# Patient Record
Sex: MALE | Race: WHITE | NOT HISPANIC OR LATINO | Employment: FULL TIME | URBAN - METROPOLITAN AREA
[De-identification: names, ages, dates, MRNs, and addresses within clinical notes are randomized per-mention and may not be internally consistent; named-entity substitution may affect disease eponyms.]

---

## 2022-05-24 ENCOUNTER — TELEPHONE (OUTPATIENT)
Dept: SCHEDULING | Facility: IMAGING CENTER | Age: 27
End: 2022-05-24

## 2022-06-01 ENCOUNTER — OFFICE VISIT (OUTPATIENT)
Dept: MEDICAL GROUP | Facility: PHYSICIAN GROUP | Age: 27
End: 2022-06-01
Payer: COMMERCIAL

## 2022-06-01 VITALS
OXYGEN SATURATION: 96 % | DIASTOLIC BLOOD PRESSURE: 64 MMHG | WEIGHT: 312.9 LBS | RESPIRATION RATE: 70 BRPM | SYSTOLIC BLOOD PRESSURE: 106 MMHG | HEIGHT: 74 IN | TEMPERATURE: 98.6 F | HEART RATE: 70 BPM | BODY MASS INDEX: 40.16 KG/M2

## 2022-06-01 DIAGNOSIS — G47.33 OBSTRUCTIVE SLEEP APNEA: ICD-10-CM

## 2022-06-01 DIAGNOSIS — L73.9 FOLLICULITIS: ICD-10-CM

## 2022-06-01 DIAGNOSIS — R63.5 WEIGHT GAIN: ICD-10-CM

## 2022-06-01 DIAGNOSIS — Z00.00 WELLNESS EXAMINATION: ICD-10-CM

## 2022-06-01 DIAGNOSIS — J45.20 MILD INTERMITTENT REACTIVE AIRWAY DISEASE WITHOUT COMPLICATION: ICD-10-CM

## 2022-06-01 DIAGNOSIS — G43.909 MIGRAINE WITHOUT STATUS MIGRAINOSUS, NOT INTRACTABLE, UNSPECIFIED MIGRAINE TYPE: ICD-10-CM

## 2022-06-01 DIAGNOSIS — F31.9 BIPOLAR AFFECTIVE DISORDER, REMISSION STATUS UNSPECIFIED (HCC): ICD-10-CM

## 2022-06-01 PROBLEM — M47.816 ARTHROPATHY OF LUMBAR FACET JOINT: Status: ACTIVE | Noted: 2021-07-07

## 2022-06-01 PROBLEM — J45.909 REACTIVE AIRWAY DISEASE WITHOUT COMPLICATION: Status: ACTIVE | Noted: 2022-06-01

## 2022-06-01 PROCEDURE — 99204 OFFICE O/P NEW MOD 45 MIN: CPT | Performed by: FAMILY MEDICINE

## 2022-06-01 RX ORDER — CHLORHEXIDINE GLUCONATE ORAL RINSE 1.2 MG/ML
SOLUTION DENTAL
COMMUNITY
Start: 2022-04-02 | End: 2022-06-01

## 2022-06-01 RX ORDER — IBUPROFEN 800 MG/1
800 TABLET ORAL EVERY 6 HOURS PRN
COMMUNITY
Start: 2022-04-02

## 2022-06-01 RX ORDER — IBUPROFEN 600 MG/1
600 TABLET ORAL
COMMUNITY
Start: 2022-01-15 | End: 2022-06-01

## 2022-06-01 RX ORDER — OLOPATADINE HYDROCHLORIDE 1 MG/ML
1 SOLUTION/ DROPS OPHTHALMIC
COMMUNITY
Start: 2020-11-03 | End: 2022-06-01

## 2022-06-01 RX ORDER — DIVALPROEX SODIUM 500 MG/1
1 TABLET, DELAYED RELEASE ORAL 2 TIMES DAILY
COMMUNITY
Start: 2022-01-07 | End: 2022-06-01 | Stop reason: SDUPTHER

## 2022-06-01 RX ORDER — ONDANSETRON HYDROCHLORIDE 8 MG/1
TABLET, FILM COATED ORAL
COMMUNITY
Start: 2022-01-07 | End: 2024-01-07

## 2022-06-01 RX ORDER — DIVALPROEX SODIUM 500 MG/1
TABLET, DELAYED RELEASE ORAL
Qty: 90 TABLET | Refills: 1 | Status: SHIPPED | OUTPATIENT
Start: 2022-06-01 | End: 2022-11-30 | Stop reason: SDUPTHER

## 2022-06-01 RX ORDER — HYDROCODONE BITARTRATE AND ACETAMINOPHEN 5; 325 MG/1; MG/1
1 TABLET ORAL EVERY 6 HOURS PRN
COMMUNITY
Start: 2022-04-03 | End: 2022-06-01

## 2022-06-01 RX ORDER — ALBUTEROL SULFATE 90 UG/1
2 AEROSOL, METERED RESPIRATORY (INHALATION)
COMMUNITY
Start: 2022-01-07 | End: 2024-01-07

## 2022-06-01 RX ORDER — CETIRIZINE HYDROCHLORIDE 10 MG/1
10 TABLET ORAL DAILY
COMMUNITY
Start: 2022-01-20 | End: 2024-01-20

## 2022-06-01 RX ORDER — RIZATRIPTAN BENZOATE 10 MG/1
TABLET, ORALLY DISINTEGRATING ORAL
COMMUNITY
Start: 2021-09-27 | End: 2023-09-27

## 2022-06-01 RX ORDER — DIVALPROEX SODIUM 250 MG/1
1 TABLET, DELAYED RELEASE ORAL 2 TIMES DAILY
COMMUNITY
Start: 2022-01-07 | End: 2022-06-01 | Stop reason: SDUPTHER

## 2022-06-01 RX ORDER — CEPHALEXIN 500 MG/1
500 CAPSULE ORAL 3 TIMES DAILY
Qty: 21 CAPSULE | Refills: 0 | Status: SHIPPED | OUTPATIENT
Start: 2022-06-01 | End: 2022-06-08

## 2022-06-01 RX ORDER — FLUTICASONE PROPIONATE 50 MCG
1 SPRAY, SUSPENSION (ML) NASAL 2 TIMES DAILY
COMMUNITY
Start: 2021-07-06 | End: 2023-07-06

## 2022-06-01 RX ORDER — DIVALPROEX SODIUM 250 MG/1
TABLET, DELAYED RELEASE ORAL
Qty: 90 TABLET | Refills: 1 | Status: SHIPPED | OUTPATIENT
Start: 2022-06-01 | End: 2022-08-01

## 2022-06-01 RX ORDER — CLINDAMYCIN HYDROCHLORIDE 150 MG/1
150 CAPSULE ORAL 3 TIMES DAILY
COMMUNITY
Start: 2022-04-02 | End: 2022-06-01

## 2022-06-01 ASSESSMENT — PATIENT HEALTH QUESTIONNAIRE - PHQ9
SUM OF ALL RESPONSES TO PHQ QUESTIONS 1-9: 12
5. POOR APPETITE OR OVEREATING: 0 - NOT AT ALL
CLINICAL INTERPRETATION OF PHQ2 SCORE: 2

## 2022-06-01 NOTE — PROGRESS NOTES
Subjective:     CC:    Chief Complaint   Patient presents with   • Establish Care       HISTORY OF THE PRESENT ILLNESS: Patient is a 27 y.o. male. This pleasant patient is here today to establish care.  Patient has moved from [place] to here recently.  Patient is still taking his Depakote but he does not take it twice a day he just taking it once a day to 250 in the 500 mg.  Patient also has issues with migraine headaches he is on Maxalt he is getting 1-2 migraine headaches per week.  Patient does have a diagnosis of bipolar question if he has attention deficit.  Patient is agreeable to see psychiatry for this.  Patient did have a diagnosis of sleep apnea but patient was tested and he cannot tolerate the CPAP machine when he was in [place].  Patient admits to having weight gain.    No problem-specific Assessment & Plan notes found for this encounter.      Allergies: Penicillins    Current Outpatient Medications Ordered in Epic   Medication Sig Dispense Refill   • albuterol 108 (90 Base) MCG/ACT Aero Soln inhalation aerosol Inhale 2 Puffs.     • cetirizine (ZYRTEC) 10 MG Tab Take 10 mg by mouth every day.     • fluticasone (FLONASE) 50 MCG/ACT nasal spray Administer 1 Spray into affected nostril(S) 2 times a day.     • ibuprofen (MOTRIN) 800 MG Tab Take 800 mg by mouth every 6 hours as needed.  FOR PAIN     • ondansetron (ZOFRAN) 8 MG Tab Take 1 tablet by mouth every 8 to 12 hours as needed for nausea or vomiting     • rizatriptan (MAXALT-MLT) 10 MG disintegrating tablet Dissolve 1 tablet in mouth at onset of migraine headache. May repeat every 2 hours if migraine is not relieved. Do not exceed 3 tablets in 24 hours     • divalproex (DEPAKOTE) 250 MG Tablet Delayed Response 1 p.o. in the a.m. 90 Tablet 1   • divalproex (DEPAKOTE) 500 MG Tablet Delayed Response To take 1 along with the 250 mg Depakote in the a.m. 90 Tablet 1   • cephALEXin (KEFLEX) 500 MG Cap Take 1 Capsule by mouth 3 times a day for 7 days. 21  "Capsule 0     No current [facility]-ordered facility-administered medications on file.       No past medical history on file.    Past Surgical History:   Procedure Laterality Date   • APPENDECTOMY     • TONSILLECTOMY         Social History     Tobacco Use   • Smoking status: Never Smoker   • Smokeless tobacco: Never Used   Vaping Use   • Vaping Use: Never used   Substance Use Topics   • Alcohol use: Not Currently   • Drug use: Never       Social History     Social History Narrative   • Not on file       Family History   Problem Relation Age of Onset   • GI Disease Mother         Crohn's   • Bipolar disorder Father    • Bipolar disorder Sister    • Migraines Sister    • Autism Brother    • Bipolar disorder Brother    • Cancer Maternal Grandfather    • Prostate cancer Maternal Grandfather        Health Maintenance: Completed    ROS:   Gen: no fevers/chills  Eyes: no changes in vision  ENT: no sore throat, no hearing loss, no bloody nose  Pulm: no sob, no cough  CV: no chest pain, no palpitations  GI: no nausea/vomiting, no diarrhea  Neuro: no headaches, no numbness/tingling  Heme/Lymph: no easy bruising      Objective:     Exam: /64 (BP Location: Left arm, Patient Position: Sitting, BP Cuff Size: Large adult)   Pulse 70   Temp 37 °C (98.6 °F) (Temporal)   Resp (!) 70   Ht 1.88 m (6' 2\")   Wt (!) 142 kg (312 lb 14.4 oz)   SpO2 96%  Body mass index is 40.17 kg/m².    Gen: Alert and oriented, No apparent distress.  Skin: Warm and dry.  Patient has on his inner thigh on the left some follicular areas not severely infected but present  Eyes: Sclera wnl Pupils normal in size  ENT: Canals wnl and TM are not red, mouth negative redness or exudates Mallampati score of class II  Lungs: Normal effort, CTA bilaterally, no wheezes, rhonchi, or rales  CV: Regular rate and rhythm. No murmurs, rubs, or gallops.  ABD: Soft non-tender no organomegaly  Musculoskeletal: Normal gait. No extremity cyanosis, clubbing, or " edema.  Neuro: Oriented to person, place and time  Psych: Mood is wnl       Labs: Fasting labs were ordered patient given a listing of all the [facility]    Assessment & Plan:   27 y.o. male with the following -    1. Mild intermittent reactive airway disease without complication  Patient does not have to use his inhaler very often this is a chronic problem  - CBC WITH DIFFERENTIAL; Future    2. Bipolar affective disorder, remission status unspecified (HCC)  Patient only using the Depakote once a day but would like to see psychiatry this is a chronic problem.  Patient denies any suicidal thoughts.    3. Migraine without status migrainosus, not intractable, unspecified migraine type    4. Obstructive sleep apnea    5. Weight gain  - TSH; Future  - TRIIDOTHYRONINE; Future    6. Wellness examination  - Comp Metabolic Panel; Future  - Lipid Profile; Future    7. Folliculitis    - divalproex (DEPAKOTE) 250 MG Tablet Delayed Response; 1 p.o. in the a.m.  Dispense: 90 Tablet; Refill: 1  - divalproex (DEPAKOTE) 500 MG Tablet Delayed Response; To take 1 along with the 250 mg Depakote in the a.m.  Dispense: 90 Tablet; Refill: 1  - cephALEXin (KEFLEX) 500 MG Cap; Take 1 Capsule by mouth 3 times a day for 7 days.  Dispense: 21 Capsule; Refill: 0       Return in about 3 weeks (around 6/22/2022).    Please note that this dictation was created using voice recognition software. I have made every reasonable attempt to correct obvious errors, but I expect that there are errors of grammar and possibly content that I did not discover before finalizing the note.

## 2022-06-20 ENCOUNTER — HOSPITAL ENCOUNTER (OUTPATIENT)
Dept: LAB | Facility: MEDICAL CENTER | Age: 27
End: 2022-06-20
Attending: FAMILY MEDICINE
Payer: COMMERCIAL

## 2022-06-20 DIAGNOSIS — R63.5 WEIGHT GAIN: ICD-10-CM

## 2022-06-20 DIAGNOSIS — Z00.00 WELLNESS EXAMINATION: ICD-10-CM

## 2022-06-20 DIAGNOSIS — J45.20 MILD INTERMITTENT REACTIVE AIRWAY DISEASE WITHOUT COMPLICATION: ICD-10-CM

## 2022-06-20 LAB
ALBUMIN SERPL BCP-MCNC: 4.4 G/DL (ref 3.2–4.9)
ALBUMIN/GLOB SERPL: 1.6 G/DL
ALP SERPL-CCNC: 90 U/L (ref 30–99)
ALT SERPL-CCNC: 14 U/L (ref 2–50)
ANION GAP SERPL CALC-SCNC: 11 MMOL/L (ref 7–16)
AST SERPL-CCNC: 16 U/L (ref 12–45)
BASOPHILS # BLD AUTO: 0.9 % (ref 0–1.8)
BASOPHILS # BLD: 0.06 K/UL (ref 0–0.12)
BILIRUB SERPL-MCNC: 0.3 MG/DL (ref 0.1–1.5)
BUN SERPL-MCNC: 15 MG/DL (ref 8–22)
CALCIUM SERPL-MCNC: 9.3 MG/DL (ref 8.5–10.5)
CHLORIDE SERPL-SCNC: 102 MMOL/L (ref 96–112)
CHOLEST SERPL-MCNC: 166 MG/DL (ref 100–199)
CO2 SERPL-SCNC: 25 MMOL/L (ref 20–33)
CREAT SERPL-MCNC: 0.94 MG/DL (ref 0.5–1.4)
EOSINOPHIL # BLD AUTO: 0.23 K/UL (ref 0–0.51)
EOSINOPHIL NFR BLD: 3.4 % (ref 0–6.9)
ERYTHROCYTE [DISTWIDTH] IN BLOOD BY AUTOMATED COUNT: 42.1 FL (ref 35.9–50)
FASTING STATUS PATIENT QL REPORTED: NORMAL
GFR SERPLBLD CREATININE-BSD FMLA CKD-EPI: 115 ML/MIN/1.73 M 2
GLOBULIN SER CALC-MCNC: 2.7 G/DL (ref 1.9–3.5)
GLUCOSE SERPL-MCNC: 94 MG/DL (ref 65–99)
HCT VFR BLD AUTO: 52.2 % (ref 42–52)
HDLC SERPL-MCNC: 29 MG/DL
HGB BLD-MCNC: 16.9 G/DL (ref 14–18)
IMM GRANULOCYTES # BLD AUTO: 0.03 K/UL (ref 0–0.11)
IMM GRANULOCYTES NFR BLD AUTO: 0.4 % (ref 0–0.9)
LDLC SERPL CALC-MCNC: 99 MG/DL
LYMPHOCYTES # BLD AUTO: 2.69 K/UL (ref 1–4.8)
LYMPHOCYTES NFR BLD: 39.2 % (ref 22–41)
MCH RBC QN AUTO: 27.4 PG (ref 27–33)
MCHC RBC AUTO-ENTMCNC: 32.4 G/DL (ref 33.7–35.3)
MCV RBC AUTO: 84.7 FL (ref 81.4–97.8)
MONOCYTES # BLD AUTO: 0.57 K/UL (ref 0–0.85)
MONOCYTES NFR BLD AUTO: 8.3 % (ref 0–13.4)
NEUTROPHILS # BLD AUTO: 3.28 K/UL (ref 1.82–7.42)
NEUTROPHILS NFR BLD: 47.8 % (ref 44–72)
NRBC # BLD AUTO: 0 K/UL
NRBC BLD-RTO: 0 /100 WBC
PLATELET # BLD AUTO: 319 K/UL (ref 164–446)
PMV BLD AUTO: 9.7 FL (ref 9–12.9)
POTASSIUM SERPL-SCNC: 4.5 MMOL/L (ref 3.6–5.5)
PROT SERPL-MCNC: 7.1 G/DL (ref 6–8.2)
RBC # BLD AUTO: 6.16 M/UL (ref 4.7–6.1)
SODIUM SERPL-SCNC: 138 MMOL/L (ref 135–145)
T3 SERPL-MCNC: 127 NG/DL (ref 60–181)
TRIGL SERPL-MCNC: 189 MG/DL (ref 0–149)
TSH SERPL DL<=0.005 MIU/L-ACNC: 2.02 UIU/ML (ref 0.38–5.33)
WBC # BLD AUTO: 6.9 K/UL (ref 4.8–10.8)

## 2022-06-20 PROCEDURE — 84443 ASSAY THYROID STIM HORMONE: CPT

## 2022-06-20 PROCEDURE — 84480 ASSAY TRIIODOTHYRONINE (T3): CPT

## 2022-06-20 PROCEDURE — 85025 COMPLETE CBC W/AUTO DIFF WBC: CPT

## 2022-06-20 PROCEDURE — 80061 LIPID PANEL: CPT

## 2022-06-20 PROCEDURE — 36415 COLL VENOUS BLD VENIPUNCTURE: CPT

## 2022-06-20 PROCEDURE — 80053 COMPREHEN METABOLIC PANEL: CPT

## 2022-11-30 ENCOUNTER — PATIENT MESSAGE (OUTPATIENT)
Dept: MEDICAL GROUP | Facility: PHYSICIAN GROUP | Age: 27
End: 2022-11-30
Payer: COMMERCIAL

## 2022-11-30 RX ORDER — DIVALPROEX SODIUM 500 MG/1
TABLET, DELAYED RELEASE ORAL
Qty: 90 TABLET | Refills: 0 | Status: SHIPPED | OUTPATIENT
Start: 2022-11-30 | End: 2024-02-22

## 2022-11-30 RX ORDER — DIVALPROEX SODIUM 250 MG/1
TABLET, DELAYED RELEASE ORAL
Qty: 90 TABLET | Refills: 0 | Status: SHIPPED | OUTPATIENT
Start: 2022-11-30 | End: 2024-02-22

## 2023-02-28 ENCOUNTER — OFFICE VISIT (OUTPATIENT)
Dept: MEDICAL GROUP | Facility: PHYSICIAN GROUP | Age: 28
End: 2023-02-28
Payer: COMMERCIAL

## 2023-02-28 VITALS
DIASTOLIC BLOOD PRESSURE: 70 MMHG | BODY MASS INDEX: 37.92 KG/M2 | HEIGHT: 75 IN | HEART RATE: 68 BPM | OXYGEN SATURATION: 97 % | RESPIRATION RATE: 20 BRPM | WEIGHT: 305 LBS | TEMPERATURE: 97 F | SYSTOLIC BLOOD PRESSURE: 120 MMHG

## 2023-02-28 DIAGNOSIS — H92.03 OTALGIA OF BOTH EARS: ICD-10-CM

## 2023-02-28 DIAGNOSIS — H65.03 BILATERAL ACUTE SEROUS OTITIS MEDIA, RECURRENCE NOT SPECIFIED: ICD-10-CM

## 2023-02-28 PROCEDURE — 99213 OFFICE O/P EST LOW 20 MIN: CPT | Performed by: NURSE PRACTITIONER

## 2023-02-28 RX ORDER — DOXYCYCLINE HYCLATE 100 MG
100 TABLET ORAL 2 TIMES DAILY
Qty: 20 TABLET | Refills: 0 | Status: SHIPPED | OUTPATIENT
Start: 2023-02-28 | End: 2023-03-10

## 2023-02-28 ASSESSMENT — PATIENT HEALTH QUESTIONNAIRE - PHQ9
5. POOR APPETITE OR OVEREATING: 2 - MORE THAN HALF THE DAYS
CLINICAL INTERPRETATION OF PHQ2 SCORE: 4
SUM OF ALL RESPONSES TO PHQ QUESTIONS 1-9: 18

## 2023-02-28 ASSESSMENT — FIBROSIS 4 INDEX: FIB4 SCORE: 0.34

## 2023-02-28 NOTE — ASSESSMENT & PLAN NOTE
Acute and ongoing. He states that for the past 3-5 weeks he has been dealing with some ear pain. Denies any fever, chills, body aches, or sinus congestion. Has not taken anything for the ear pain.

## 2023-02-28 NOTE — PROGRESS NOTES
Subjective  Chief Complaint  Ear Pain    History of Present Illness  Lex Foster is a 27 y.o. male. This established patient is here today to discuss his ear pain.    His PCP is Dr. Mary Anne Alvarez.    Otalgia of both ears  Acute and ongoing. He states that for the past 3-5 weeks he has been dealing with some ear pain. Denies any fever, chills, body aches, or sinus congestion. Has not taken anything for the ear pain.    Past Medical History    Allergies: Penicillins  History reviewed. No pertinent past medical history.  Past Surgical History:   Procedure Laterality Date    APPENDECTOMY      TONSILLECTOMY       Current Outpatient Medications Ordered in Epic   Medication Sig Dispense Refill    doxycycline (VIBRAMYCIN) 100 MG Tab Take 1 Tablet by mouth 2 times a day for 10 days. 20 Tablet 0    divalproex (DEPAKOTE) 500 MG Tablet Delayed Response To take 1 along with the 250 mg Depakote in the a.m. 90 Tablet 0    divalproex (DEPAKOTE) 250 MG Tablet Delayed Response Take 1 tablet along with the 500 mg Depakote nightly a.m. 90 Tablet 0    albuterol 108 (90 Base) MCG/ACT Aero Soln inhalation aerosol Inhale 2 Puffs.      cetirizine (ZYRTEC) 10 MG Tab Take 10 mg by mouth every day.      fluticasone (FLONASE) 50 MCG/ACT nasal spray Administer 1 Spray into affected nostril(S) 2 times a day.      ibuprofen (MOTRIN) 800 MG Tab Take 800 mg by mouth every 6 hours as needed.  FOR PAIN      ondansetron (ZOFRAN) 8 MG Tab Take 1 tablet by mouth every 8 to 12 hours as needed for nausea or vomiting      rizatriptan (MAXALT-MLT) 10 MG disintegrating tablet Dissolve 1 tablet in mouth at onset of migraine headache. May repeat every 2 hours if migraine is not relieved. Do not exceed 3 tablets in 24 hours       No current [facility]-ordered facility-administered medications on file.     Family History:    Family History   Problem Relation Age of Onset    GI Disease Mother         Crohn's    Bipolar disorder Father     Bipolar disorder Sister  "    Migraines Sister     Autism Brother     Bipolar disorder Brother     Cancer Maternal Grandfather     Prostate cancer Maternal Grandfather       Personal/Social History:    Social History     Tobacco Use    Smoking status: Never    Smokeless tobacco: Never   Vaping Use    Vaping Use: Never used   Substance Use Topics    Alcohol use: Not Currently    Drug use: Never     Social History     Social History Narrative    Not on file      Review of Systems:   General: Negative for fever/chills and unexpected weight change.   ENT:  Negative for hearing changes, ear pain, congestion, sore throat, and neck pain. Positive for ear pain.  Respiratory:  Negative for cough and dyspnea.    Cardiovascular:  Negative for chest pain and palpitations.  Musculoskeletal:  Negative for myalgias.   Skin:  Negative for rash.     Objective  Physical Exam:   /70 (BP Location: Left arm, Patient Position: Sitting, BP Cuff Size: Large adult)   Pulse 68   Temp 36.1 °C (97 °F) (Temporal)   Resp 20   Ht 1.905 m (6' 3\")   Wt (!) 138 kg (305 lb)   SpO2 97%  Body mass index is 38.12 kg/m².  General:  Alert and oriented.  Well appearing.  NAD  Neck: Supple without JVD. No lymphadenopathy.  ENT: Bilateral ear canals clear with erythema, tympanic membranes with bulging effusion.  Pulmonary:  Normal effort.  Clear to ausculation without rales, ronchi, or wheezing.  Cardiovascular:  Regular rate and rhythm without murmur, rubs or gallop.   Skin:  Warm and dry.  No obvious lesions.  Musculoskeletal:  No extremity cyanosis, clubbing, or edema.      Assessment/Plan  1. Otalgia of both ears  2. Bilateral acute serous otitis media, recurrence not specified  New diagnosis.  Acute and ongoing.  Discussed that he does have a bilateral ear infection and needs to be treated with antibiotics, he verbalized understanding.  Discussed Doxycycline risks, benefits and side effects, he verbalized understanding.  Educated on taking the entire course of " antibiotics even if he starts to feel better.  - doxycycline (VIBRAMYCIN) 100 MG Tab; Take 1 Tablet by mouth 2 times a day for 10 days.  Dispense: 20 Tablet; Refill: 0      Health Maintenance: Deferred to PCP.    Return if symptoms worsen or fail to improve.    Discussed that the patient carries some responsibility in management of their health care.    Please note that this dictation was created using voice recognition software. I have made every reasonable attempt to correct obvious errors, but I expect that there are errors of grammar and possibly content that I did not discover before finalizing the note.    DUKE Finn  [facility]

## 2023-03-21 ENCOUNTER — OFFICE VISIT (OUTPATIENT)
Dept: MEDICAL GROUP | Facility: PHYSICIAN GROUP | Age: 28
End: 2023-03-21
Payer: COMMERCIAL

## 2023-03-21 VITALS
HEIGHT: 75 IN | WEIGHT: 310.5 LBS | DIASTOLIC BLOOD PRESSURE: 78 MMHG | BODY MASS INDEX: 38.61 KG/M2 | SYSTOLIC BLOOD PRESSURE: 122 MMHG | OXYGEN SATURATION: 96 % | RESPIRATION RATE: 20 BRPM | HEART RATE: 64 BPM | TEMPERATURE: 97 F

## 2023-03-21 DIAGNOSIS — H92.03 OTALGIA OF BOTH EARS: ICD-10-CM

## 2023-03-21 DIAGNOSIS — R68.84 JAW PAIN: ICD-10-CM

## 2023-03-21 PROCEDURE — 99213 OFFICE O/P EST LOW 20 MIN: CPT | Performed by: NURSE PRACTITIONER

## 2023-03-21 ASSESSMENT — FIBROSIS 4 INDEX: FIB4 SCORE: 0.34

## 2023-03-21 NOTE — ASSESSMENT & PLAN NOTE
Acute and ongoing. He was seen about 3 weeks ago and was prescribed medication for his ear pain. He states that it got better a bit but he is now noticing that when he is eating and biting down he will get ear pain.

## 2023-03-21 NOTE — PROGRESS NOTES
Subjective  Chief Complaint  Ear Pain    History of Present Illness  Lex Foster is a 27 y.o. male. This established patient is here today to follow up on his ear pain.    Otalgia of both ears  Acute and ongoing. He was seen about 3 weeks ago and was prescribed medication for his ear pain. He states that it got better a bit but he is now noticing that when he is eating and biting down he will get ear pain.    Past Medical History    Allergies: Penicillins  History reviewed. No pertinent past medical history.  Past Surgical History:   Procedure Laterality Date    APPENDECTOMY      TONSILLECTOMY       Current Outpatient Medications Ordered in Epic   Medication Sig Dispense Refill    divalproex (DEPAKOTE) 500 MG Tablet Delayed Response To take 1 along with the 250 mg Depakote in the a.m. 90 Tablet 0    divalproex (DEPAKOTE) 250 MG Tablet Delayed Response Take 1 tablet along with the 500 mg Depakote nightly a.m. 90 Tablet 0    albuterol 108 (90 Base) MCG/ACT Aero Soln inhalation aerosol Inhale 2 Puffs.      cetirizine (ZYRTEC) 10 MG Tab Take 10 mg by mouth every day.      fluticasone (FLONASE) 50 MCG/ACT nasal spray Administer 1 Spray into affected nostril(S) 2 times a day.      ibuprofen (MOTRIN) 800 MG Tab Take 800 mg by mouth every 6 hours as needed.  FOR PAIN      ondansetron (ZOFRAN) 8 MG Tab Take 1 tablet by mouth every 8 to 12 hours as needed for nausea or vomiting      rizatriptan (MAXALT-MLT) 10 MG disintegrating tablet Dissolve 1 tablet in mouth at onset of migraine headache. May repeat every 2 hours if migraine is not relieved. Do not exceed 3 tablets in 24 hours       No current [facility]-ordered facility-administered medications on file.     Family History:    Family History   Problem Relation Age of Onset    GI Disease Mother         Crohn's    Bipolar disorder Father     Bipolar disorder Sister     Migraines Sister     Autism Brother     Bipolar disorder Brother     Cancer Maternal Grandfather      "Prostate cancer Maternal Grandfather       Personal/Social History:    Social History     Tobacco Use    Smoking status: Never    Smokeless tobacco: Never   Vaping Use    Vaping Use: Never used   Substance Use Topics    Alcohol use: Not Currently    Drug use: Never     Social History     Social History Narrative    Not on file      Review of Systems:   General: Negative for fever/chills and unexpected weight change.   ENT:  Negative for hearing changes, congestion, sore throat, and neck pain. Positive for ear pain and jaw pain.  Respiratory:  Negative for cough and dyspnea.    Cardiovascular:  Negative for chest pain and palpitations.  Musculoskeletal:  Negative for myalgias.   Skin:  Negative for rash.     Objective  Physical Exam:   /78 (BP Location: Left arm, Patient Position: Sitting, BP Cuff Size: Large adult)   Pulse 64   Temp 36.1 °C (97 °F) (Temporal)   Resp 20   Ht 1.905 m (6' 3\")   Wt (!) 141 kg (310 lb 8 oz)   SpO2 96%  Body mass index is 38.81 kg/m².  General:  Alert and oriented.  Well appearing.  NAD  Neck: Supple without JVD. No lymphadenopathy.  ENT: Bilateral ear canals clear with mild erythema, tympanic membranes non bulging with no effusion. Bilateral upper jaw tender to palpation.  Pulmonary:  Normal effort.  Clear to ausculation without rales, ronchi, or wheezing.  Cardiovascular:  Regular rate and rhythm without murmur, rubs or gallop.   Skin:  Warm and dry.  No obvious lesions.  Musculoskeletal:  No extremity cyanosis, clubbing, or edema.      Assessment/Plan  1. Otalgia of both ears  2. Jaw pain  Acute and ongoing.  Discussed that his previous ear infection has resolved with the treatment of antibiotics. Discussed that he may be experiencing TMJ symptoms and that he should be seen by an ENT, he is agreeable.  He did state that he does grind his teeth a lot at night.  - Referral to ENT      Health Maintenance: Discussed with patient.    Return if symptoms worsen or fail to " improve.    Discussed that the patient carries some responsibility in management of their health care.    Please note that this dictation was created using voice recognition software. I have made every reasonable attempt to correct obvious errors, but I expect that there are errors of grammar and possibly content that I did not discover before finalizing the note.    DUKE Finn  [facility]

## 2023-05-30 ENCOUNTER — OFFICE VISIT (OUTPATIENT)
Dept: BEHAVIORAL HEALTH | Facility: CLINIC | Age: 28
End: 2023-05-30
Payer: COMMERCIAL

## 2023-05-30 DIAGNOSIS — F31.9 BIPOLAR 1 DISORDER, DEPRESSED (HCC): ICD-10-CM

## 2023-05-30 DIAGNOSIS — F90.2 ATTENTION DEFICIT HYPERACTIVITY DISORDER, COMBINED TYPE: ICD-10-CM

## 2023-05-30 PROCEDURE — 90792 PSYCH DIAG EVAL W/MED SRVCS: CPT | Performed by: PSYCHIATRY & NEUROLOGY

## 2023-05-30 RX ORDER — OXCARBAZEPINE 150 MG/1
150 TABLET, FILM COATED ORAL 2 TIMES DAILY
Qty: 60 TABLET | Refills: 0 | Status: SHIPPED | OUTPATIENT
Start: 2023-05-30 | End: 2023-06-29

## 2023-05-30 NOTE — PROGRESS NOTES
[facility]   Therapy Progress Note      This provider informed the patient their medical records are totally confidential except for the use by other providers involved in their care, or if the patient signs a release, or to report instances of child or elder abuse, or if it is determined they are an immediate risk to harm themselves or others.    Name: Lex Foster  MRN: [id number]  : 1995  Age: 28 y.o.  Date of assessment: 2023  PCP: JOSE DE JESUS Velasco      Present Illness:   Chart reviewed prior to seeing him in my office.  He is 28 years old,  approximately 9 months and has a 6-month-old son.  He works as a  for a solar [employer] company.  He is referred for evaluation of bipolar disorder and ADHD.  He does experience abrupt mood changes for no particular reason and at times is very irritable.  He has also overspent previously he does have episodes of racing thoughts which can last 10 or 15 minutes on a daily basis this has been going on for years.  He usually feels depressed afterwards.  Self-esteem is down.  He does experience insomnia with frequent awakening.  Appetite is okay.  Energy and short-term memory are affected.  He denies being suicidal.  When questioned about ADHD he indicates that he is restless, distractible, and impulsive at times.  He denies any difficulty in completing projects.  He struggled academically but did get a GED.  He has difficulty focusing on movies and books.    Past Psych History:   No previous psychiatric hospitalization.  He did see a psychiatrist once years ago.    Substance Abuse History:  No alcohol or substance abuse problems.    Family History:   His father is bipolar.  His 21-year-old sister has recently been diagnosed as having bipolar disorder.    Medical History:  Asthma, migraines    Psych Medications:  He is currently on Depakote 750 mg total a.m. but is not sure it has any significant benefit for his bipolar  symptoms.    Allergies:   Penicillin    Mental Status:   He is alert, oriented, and cooperative.  Relatedness is good.  Grooming is good.  Speech is normal rate.  Anxious.  Memory is fairly good.  Insight and judgment are fairly good.  No indication of psychotic thinking.    Current Risk:       Suicidal: Not suicidal       Homicidal: Not homicidal       Self-Harm: No plan to harm self       Relapse (Low/Moderate/High): Moderate       Crisis Safety Plan Reviewed: Discussed with patient    Diagnosis:  Bipolar 1 disorder  ADHD    Treatment Plan:  The current treatment plan consists of a follow-up visit in 2 weeks and then monthly psychiatric sessions designed to evaluate his bipolar disorder and ADHD.    Duration will be for a minimum of 12 months and will be reviewed at each visit.    Goals: Stabilization of moods with improvement in ADHD symptoms in order to prevent relapse due to the chronic nature of his behavioral health problems and mental illness.  Start Trileptal 150 mg twice daily.  Possible side effects discussed.  If he responds well to Trileptal Depakote will be tapered off.  When his moods are stable his ADHD symptoms will be addressed.      Marcio Powers M.D.     This note was created using voice recognition software (Dragon). The accuracy of the dictation is limited by the abilities of the software. I have reviewed the note prior to signing, however some errors in grammar and context are still possible. If you have any questions related to this note please do not hesitate to contact our office.

## 2023-09-22 ENCOUNTER — OFFICE VISIT (OUTPATIENT)
Dept: MEDICAL GROUP | Facility: PHYSICIAN GROUP | Age: 28
End: 2023-09-22
Payer: COMMERCIAL

## 2023-09-22 VITALS
DIASTOLIC BLOOD PRESSURE: 76 MMHG | RESPIRATION RATE: 18 BRPM | HEIGHT: 75 IN | OXYGEN SATURATION: 94 % | BODY MASS INDEX: 37.7 KG/M2 | HEART RATE: 86 BPM | TEMPERATURE: 97.5 F | SYSTOLIC BLOOD PRESSURE: 118 MMHG | WEIGHT: 303.2 LBS

## 2023-09-22 DIAGNOSIS — E55.9 VITAMIN D DEFICIENCY: ICD-10-CM

## 2023-09-22 DIAGNOSIS — G43.909 MIGRAINE WITHOUT STATUS MIGRAINOSUS, NOT INTRACTABLE, UNSPECIFIED MIGRAINE TYPE: ICD-10-CM

## 2023-09-22 DIAGNOSIS — R63.5 WEIGHT GAIN: ICD-10-CM

## 2023-09-22 DIAGNOSIS — E78.6 LOW HDL (UNDER 40): ICD-10-CM

## 2023-09-22 PROCEDURE — 3078F DIAST BP <80 MM HG: CPT | Performed by: FAMILY MEDICINE

## 2023-09-22 PROCEDURE — 3074F SYST BP LT 130 MM HG: CPT | Performed by: FAMILY MEDICINE

## 2023-09-22 PROCEDURE — 99214 OFFICE O/P EST MOD 30 MIN: CPT | Performed by: FAMILY MEDICINE

## 2023-09-22 ASSESSMENT — FIBROSIS 4 INDEX: FIB4 SCORE: 0.35

## 2023-09-22 NOTE — PROGRESS NOTES
Subjective:     CC:   Chief Complaint   Patient presents with    Follow-Up       HPI:   Lex presents today requesting a work note to the fact that he suffers with migraines and the he may have to leave work if he has a severe headache.  Patient's been diagnosed as having migraines as a child.  Patient has not seen neurologist in the last couple years.  Patient states that he had a migraine maybe a day ago and then 3 weeks prior.  Patient also did see psychiatry but at this time patient does not want to follow-up he was seen recently in May.  Patient is not wanting to be on medication for his psychological issues and discussing his migraines he is only taking over-the-counter medication when he gets his migraine.    History reviewed. No pertinent past medical history.    Social History     Tobacco Use    Smoking status: Never    Smokeless tobacco: Never   Vaping Use    Vaping Use: Never used   Substance Use Topics    Alcohol use: Not Currently    Drug use: Never       Current Outpatient Medications Ordered in Epic   Medication Sig Dispense Refill    divalproex (DEPAKOTE) 500 MG Tablet Delayed Response To take 1 along with the 250 mg Depakote in the a.m. 90 Tablet 0    divalproex (DEPAKOTE) 250 MG Tablet Delayed Response Take 1 tablet along with the 500 mg Depakote nightly a.m. 90 Tablet 0    albuterol 108 (90 Base) MCG/ACT Aero Soln inhalation aerosol Inhale 2 Puffs.      cetirizine (ZYRTEC) 10 MG Tab Take 10 mg by mouth every day.      ibuprofen (MOTRIN) 800 MG Tab Take 800 mg by mouth every 6 hours as needed.  FOR PAIN      ondansetron (ZOFRAN) 8 MG Tab Take 1 tablet by mouth every 8 to 12 hours as needed for nausea or vomiting      rizatriptan (MAXALT-MLT) 10 MG disintegrating tablet Dissolve 1 tablet in mouth at onset of migraine headache. May repeat every 2 hours if migraine is not relieved. Do not exceed 3 tablets in 24 hours       No current [facility]-ordered facility-administered medications on file.  "      Allergies:  Penicillins    Health Maintenance: Completed    ROS:  Gen: no fevers/chills, no changes in weight  Eyes: no changes in vision  ENT: no sore throat, no hearing loss, no bloody nose  Pulm: no sob, no cough  CV: no chest pain, no palpitations  GI: no nausea/vomiting, no diarrhea  : no dysuria  Neuro: no headaches, no numbness/tingling  Heme/Lymph: no easy bruising    Objective:     Exam:  /76 (BP Location: Left arm, Patient Position: Sitting, BP Cuff Size: Large adult)   Pulse 86   Temp 36.4 °C (97.5 °F) (Temporal)   Resp 18   Ht 1.905 m (6' 3\")   Wt (!) 138 kg (303 lb 3.2 oz)   SpO2 94%   BMI 37.90 kg/m²  Body mass index is 37.9 kg/m².    Gen: Alert and oriented, No apparent distress.  Skin: Warm and dry.  No obvious lesions.  Eyes: Sclera wnl Pupils normal in size  Lungs: Normal effort, CTA bilaterally, no wheezes, rhonchi, or rales  CV: Regular rate and rhythm. No murmurs, rubs, or gallops.  Musculoskeletal: Normal gait. No extremity cyanosis, clubbing, or edema.  Neuro: Oriented to person, place and time  Psych: Mood is wnl       Labs: Fasting labs were ordered    Assessment & Plan:     28 y.o. male with the following -     1. Migraine without status migrainosus, not intractable, unspecified migraine type  I recommend ordering lab work I also wrote a referral to neurology.  Also wrote him a work note stating that he has migraines I did get permission from patient to put that in the note.  And I am write a referral to neurology it may take up to 2 months to get appointment.  - CBC WITH DIFFERENTIAL; Future  - Comp Metabolic Panel; Future    2. Low HDL (under 40)  Recommend him getting his lab work done  - Lipid Profile; Future    3. Weight gain  - TSH; Future    4. Vitamin D deficiency  Last vitamin D was decreased we will recheck this again  - VITAMIN D,25 HYDROXY (DEFICIENCY); Future       Return in about 2 months (around 11/22/2023).    Please note that this dictation was created " using voice recognition software. I have made every reasonable attempt to correct obvious errors, but I expect that there are errors of grammar and possibly content that I did not discover before finalizing the note.

## 2023-09-22 NOTE — LETTER
September 22, 2023         Patient: Lex Galvin   YOB: 1995   Date of Visit: 9/22/2023           To Whom it May Concern:    Lex Galvin was seen in my clinic on 9/22/2023.  Patient does have migraines and he may not be able to work on days when his headaches get severe.  I will be referring him to neurology for evaluation it may take up to 2 months to be seen by her neurologist.    If you have any questions or concerns, please don't hesitate to call.        Sincerely,           Mary Anne Alvarez M.D.  Electronically Signed

## 2023-09-26 ENCOUNTER — OFFICE VISIT (OUTPATIENT)
Dept: URGENT CARE | Facility: CLINIC | Age: 28
End: 2023-09-26
Payer: COMMERCIAL

## 2023-09-26 VITALS
TEMPERATURE: 97.6 F | BODY MASS INDEX: 36.53 KG/M2 | SYSTOLIC BLOOD PRESSURE: 130 MMHG | RESPIRATION RATE: 20 BRPM | WEIGHT: 300 LBS | HEART RATE: 83 BPM | DIASTOLIC BLOOD PRESSURE: 70 MMHG | OXYGEN SATURATION: 98 % | HEIGHT: 76 IN

## 2023-09-26 DIAGNOSIS — J06.9 VIRAL URI: Primary | ICD-10-CM

## 2023-09-26 DIAGNOSIS — J02.9 SORE THROAT: ICD-10-CM

## 2023-09-26 LAB
FLUAV RNA SPEC QL NAA+PROBE: NEGATIVE
FLUBV RNA SPEC QL NAA+PROBE: NEGATIVE
RSV RNA SPEC QL NAA+PROBE: NEGATIVE
S PYO DNA SPEC NAA+PROBE: NOT DETECTED
SARS-COV-2 RNA RESP QL NAA+PROBE: NEGATIVE

## 2023-09-26 PROCEDURE — 3078F DIAST BP <80 MM HG: CPT | Performed by: PHYSICIAN ASSISTANT

## 2023-09-26 PROCEDURE — 87651 STREP A DNA AMP PROBE: CPT | Performed by: PHYSICIAN ASSISTANT

## 2023-09-26 PROCEDURE — 3075F SYST BP GE 130 - 139MM HG: CPT | Performed by: PHYSICIAN ASSISTANT

## 2023-09-26 PROCEDURE — 0241U POCT CEPHEID COV-2, FLU A/B, RSV - PCR: CPT | Performed by: PHYSICIAN ASSISTANT

## 2023-09-26 PROCEDURE — 99213 OFFICE O/P EST LOW 20 MIN: CPT | Performed by: PHYSICIAN ASSISTANT

## 2023-09-26 ASSESSMENT — ENCOUNTER SYMPTOMS
HEADACHES: 1
VOMITING: 0
FEVER: 0
SHORTNESS OF BREATH: 0
COUGH: 1
RHINORRHEA: 1
DIZZINESS: 0
SORE THROAT: 1
NAUSEA: 0
DIARRHEA: 0
WHEEZING: 0
CHILLS: 0
ABDOMINAL PAIN: 0

## 2023-09-26 ASSESSMENT — FIBROSIS 4 INDEX: FIB4 SCORE: 0.35

## 2023-09-26 NOTE — LETTER
September 26, 2023         Patient: Lex Galvin   YOB: 1995   Date of Visit: 9/26/2023           To Whom it May Concern:    Lex Galvin was seen in my clinic on 9/26/2023. He may return to work on Friday, September 29, 2023.            Sincerely,           Buffy Euceda P.A.-C.  Electronically Signed

## 2023-09-26 NOTE — LETTER
September 26, 2023         Patient: Lex Galvin   YOB: 1995   Date of Visit: 9/26/2023           To Whom it May Concern:    Lex Galvin was seen in my clinic on 9/26/2023. He may return to work once he has been fever free for at least 24 hours without the use of fever reducing medications.            Sincerely,           Buffy Euceda P.A.-C.  Electronically Signed

## 2023-09-26 NOTE — PROGRESS NOTES
Subjective     Jaime Galvin is a 28 y.o. male who presents with URI (Cough, runny nose. Symptoms started Sunday )      URI   This is a new problem. The current episode started in the past 7 days (started 2-3 days ago). There has been no fever. Associated symptoms include congestion, coughing, headaches, a plugged ear sensation, rhinorrhea and a sore throat. Pertinent negatives include no abdominal pain, chest pain, diarrhea, nausea, rash, vomiting or wheezing. Treatments tried: NyQuil. The treatment provided mild relief.   Sore throat is still persistent and he has developed laryngitis.  He did not home test for COVID-19 virus yesterday and they note that there was a very faint positive line on it but his employer wanted him to come in for more formal evaluation and testing.    Review of Systems   Constitutional:  Positive for malaise/fatigue. Negative for chills and fever.   HENT:  Positive for congestion, rhinorrhea and sore throat.    Respiratory:  Positive for cough. Negative for shortness of breath and wheezing.    Cardiovascular:  Negative for chest pain.   Gastrointestinal:  Negative for abdominal pain, diarrhea, nausea and vomiting.   Skin:  Negative for rash.   Neurological:  Positive for headaches. Negative for dizziness.           PMH:  has no past medical history on file.  MEDS:   Current Outpatient Medications:     divalproex (DEPAKOTE) 500 MG Tablet Delayed Response, To take 1 along with the 250 mg Depakote in the a.m., Disp: 90 Tablet, Rfl: 0    divalproex (DEPAKOTE) 250 MG Tablet Delayed Response, Take 1 tablet along with the 500 mg Depakote nightly a.m., Disp: 90 Tablet, Rfl: 0    albuterol 108 (90 Base) MCG/ACT Aero Soln inhalation aerosol, Inhale 2 Puffs., Disp: , Rfl:     cetirizine (ZYRTEC) 10 MG Tab, Take 10 mg by mouth every day., Disp: , Rfl:     ibuprofen (MOTRIN) 800 MG Tab, Take 800 mg by mouth every 6 hours as needed.  FOR PAIN, Disp: , Rfl:     ondansetron (ZOFRAN) 8 MG Tab, Take 1  "tablet by mouth every 8 to 12 hours as needed for nausea or vomiting, Disp: , Rfl:     rizatriptan (MAXALT-MLT) 10 MG disintegrating tablet, Dissolve 1 tablet in mouth at onset of migraine headache. May repeat every 2 hours if migraine is not relieved. Do not exceed 3 tablets in 24 hours, Disp: , Rfl:   ALLERGIES:   Allergies   Allergen Reactions    Penicillins      Upset stomach     SURGHX:   Past Surgical History:   Procedure Laterality Date    APPENDECTOMY      TONSILLECTOMY       SOCHX:  reports that he has never smoked. He has never used smokeless tobacco. He reports that he does not currently use alcohol. He reports that he does not use drugs.  FH: Family history was reviewed, no pertinent findings to report      Objective     /70 (BP Location: Left arm, Patient Position: Sitting, BP Cuff Size: Large adult)   Pulse 83   Temp 36.4 °C (97.6 °F) (Temporal)   Resp 20   Ht 1.93 m (6' 4\")   Wt (!) 136 kg (300 lb)   SpO2 98%   BMI 36.52 kg/m²      Physical Exam  Constitutional:       Appearance: He is well-developed.   HENT:      Head: Normocephalic and atraumatic.      Right Ear: Tympanic membrane, ear canal and external ear normal.      Left Ear: Tympanic membrane, ear canal and external ear normal.      Nose: Mucosal edema and congestion present. No rhinorrhea.      Mouth/Throat:      Lips: Pink.      Mouth: Mucous membranes are moist.      Pharynx: Oropharynx is clear. Uvula midline. Posterior oropharyngeal erythema present. No oropharyngeal exudate or uvula swelling.   Eyes:      Conjunctiva/sclera: Conjunctivae normal.      Pupils: Pupils are equal, round, and reactive to light.   Cardiovascular:      Rate and Rhythm: Normal rate and regular rhythm.      Heart sounds: Normal heart sounds. No murmur heard.  Pulmonary:      Effort: Pulmonary effort is normal.      Breath sounds: Normal breath sounds. No decreased breath sounds, wheezing, rhonchi or rales.   Musculoskeletal:      Cervical back: Normal " range of motion.   Lymphadenopathy:      Cervical: Cervical adenopathy present.   Skin:     General: Skin is warm and dry.      Capillary Refill: Capillary refill takes less than 2 seconds.   Neurological:      Mental Status: He is alert and oriented to person, place, and time.   Psychiatric:         Behavior: Behavior normal.         Judgment: Judgment normal.     POCT GROUP A STREP, PCR - Negative    POCT CoV-2, Flu A/B, RSV by PCR - Negative    Assessment & Plan     1. Sore throat  - POCT GROUP A STREP, PCR  - POCT CoV-2, Flu A/B, RSV by PCR  -Supportive care discussed to include salt water gargles, throat lozenges, and increased fluid intake    2. Viral URI  - POCT CoV-2, Flu A/B, RSV by PCR  - OTC cold/flu medications  -Supportive care also discussed to include the use of saline nasal rinses, steam inhalation, and the use of a cool-mist humidifier in the bedroom at night.  - PO fluids  - Rest  - Tylenol or ibuprofen as needed for fever > 100.4 F          Differential Diagnosis, natural history, and supportive care discussed. Return to the Urgent Care or follow up with your PCP if symptoms fail to resolve, or for any new or worsening symptoms. Emergency room precautions discussed. Patient and/or family appears understanding of information.

## 2023-10-05 ENCOUNTER — TELEPHONE (OUTPATIENT)
Dept: HEALTH INFORMATION MANAGEMENT | Facility: OTHER | Age: 28
End: 2023-10-05
Payer: COMMERCIAL

## 2024-02-03 ENCOUNTER — HOSPITAL ENCOUNTER (OUTPATIENT)
Dept: LAB | Facility: MEDICAL CENTER | Age: 29
End: 2024-02-03
Attending: FAMILY MEDICINE
Payer: COMMERCIAL

## 2024-02-03 DIAGNOSIS — E78.6 LOW HDL (UNDER 40): ICD-10-CM

## 2024-02-03 DIAGNOSIS — E55.9 VITAMIN D DEFICIENCY: ICD-10-CM

## 2024-02-03 DIAGNOSIS — R63.5 WEIGHT GAIN: ICD-10-CM

## 2024-02-03 DIAGNOSIS — G43.909 MIGRAINE WITHOUT STATUS MIGRAINOSUS, NOT INTRACTABLE, UNSPECIFIED MIGRAINE TYPE: ICD-10-CM

## 2024-02-03 LAB
25(OH)D3 SERPL-MCNC: 26 NG/ML (ref 30–100)
ALBUMIN SERPL BCP-MCNC: 4.6 G/DL (ref 3.2–4.9)
ALBUMIN/GLOB SERPL: 1.6 G/DL
ALP SERPL-CCNC: 78 U/L (ref 30–99)
ALT SERPL-CCNC: 17 U/L (ref 2–50)
ANION GAP SERPL CALC-SCNC: 12 MMOL/L (ref 7–16)
AST SERPL-CCNC: 19 U/L (ref 12–45)
BASOPHILS # BLD AUTO: 0.6 % (ref 0–1.8)
BASOPHILS # BLD: 0.04 K/UL (ref 0–0.12)
BILIRUB SERPL-MCNC: 0.3 MG/DL (ref 0.1–1.5)
BUN SERPL-MCNC: 16 MG/DL (ref 8–22)
CALCIUM ALBUM COR SERPL-MCNC: 9.1 MG/DL (ref 8.5–10.5)
CALCIUM SERPL-MCNC: 9.6 MG/DL (ref 8.5–10.5)
CHLORIDE SERPL-SCNC: 103 MMOL/L (ref 96–112)
CHOLEST SERPL-MCNC: 176 MG/DL (ref 100–199)
CO2 SERPL-SCNC: 23 MMOL/L (ref 20–33)
CREAT SERPL-MCNC: 0.87 MG/DL (ref 0.5–1.4)
EOSINOPHIL # BLD AUTO: 0.12 K/UL (ref 0–0.51)
EOSINOPHIL NFR BLD: 1.8 % (ref 0–6.9)
ERYTHROCYTE [DISTWIDTH] IN BLOOD BY AUTOMATED COUNT: 40.7 FL (ref 35.9–50)
FASTING STATUS PATIENT QL REPORTED: NORMAL
GFR SERPLBLD CREATININE-BSD FMLA CKD-EPI: 121 ML/MIN/1.73 M 2
GLOBULIN SER CALC-MCNC: 2.9 G/DL (ref 1.9–3.5)
GLUCOSE SERPL-MCNC: 89 MG/DL (ref 65–99)
HCT VFR BLD AUTO: 52.1 % (ref 42–52)
HDLC SERPL-MCNC: 36 MG/DL
HGB BLD-MCNC: 17 G/DL (ref 14–18)
IMM GRANULOCYTES # BLD AUTO: 0.02 K/UL (ref 0–0.11)
IMM GRANULOCYTES NFR BLD AUTO: 0.3 % (ref 0–0.9)
LDLC SERPL CALC-MCNC: 114 MG/DL
LYMPHOCYTES # BLD AUTO: 2.46 K/UL (ref 1–4.8)
LYMPHOCYTES NFR BLD: 36.8 % (ref 22–41)
MCH RBC QN AUTO: 27.1 PG (ref 27–33)
MCHC RBC AUTO-ENTMCNC: 32.6 G/DL (ref 32.3–36.5)
MCV RBC AUTO: 83 FL (ref 81.4–97.8)
MONOCYTES # BLD AUTO: 0.42 K/UL (ref 0–0.85)
MONOCYTES NFR BLD AUTO: 6.3 % (ref 0–13.4)
NEUTROPHILS # BLD AUTO: 3.62 K/UL (ref 1.82–7.42)
NEUTROPHILS NFR BLD: 54.2 % (ref 44–72)
NRBC # BLD AUTO: 0 K/UL
NRBC BLD-RTO: 0 /100 WBC (ref 0–0.2)
PLATELET # BLD AUTO: 339 K/UL (ref 164–446)
PMV BLD AUTO: 9.7 FL (ref 9–12.9)
POTASSIUM SERPL-SCNC: 4.4 MMOL/L (ref 3.6–5.5)
PROT SERPL-MCNC: 7.5 G/DL (ref 6–8.2)
RBC # BLD AUTO: 6.28 M/UL (ref 4.7–6.1)
SODIUM SERPL-SCNC: 138 MMOL/L (ref 135–145)
TRIGL SERPL-MCNC: 132 MG/DL (ref 0–149)
TSH SERPL DL<=0.005 MIU/L-ACNC: 1.42 UIU/ML (ref 0.38–5.33)
WBC # BLD AUTO: 6.7 K/UL (ref 4.8–10.8)

## 2024-02-03 PROCEDURE — 80053 COMPREHEN METABOLIC PANEL: CPT

## 2024-02-03 PROCEDURE — 85025 COMPLETE CBC W/AUTO DIFF WBC: CPT

## 2024-02-03 PROCEDURE — 80061 LIPID PANEL: CPT

## 2024-02-03 PROCEDURE — 36415 COLL VENOUS BLD VENIPUNCTURE: CPT

## 2024-02-03 PROCEDURE — 84443 ASSAY THYROID STIM HORMONE: CPT

## 2024-02-03 PROCEDURE — 82306 VITAMIN D 25 HYDROXY: CPT

## 2024-02-22 ENCOUNTER — OFFICE VISIT (OUTPATIENT)
Dept: MEDICAL GROUP | Facility: PHYSICIAN GROUP | Age: 29
End: 2024-02-22
Payer: COMMERCIAL

## 2024-02-22 VITALS
SYSTOLIC BLOOD PRESSURE: 114 MMHG | OXYGEN SATURATION: 97 % | DIASTOLIC BLOOD PRESSURE: 78 MMHG | HEIGHT: 75 IN | BODY MASS INDEX: 37.3 KG/M2 | TEMPERATURE: 97.6 F | HEART RATE: 72 BPM | WEIGHT: 300 LBS

## 2024-02-22 DIAGNOSIS — E66.3 OVERWEIGHT: ICD-10-CM

## 2024-02-22 DIAGNOSIS — R53.82 CHRONIC FATIGUE: ICD-10-CM

## 2024-02-22 DIAGNOSIS — E78.5 DYSLIPIDEMIA: ICD-10-CM

## 2024-02-22 DIAGNOSIS — F31.9 BIPOLAR 1 DISORDER, DEPRESSED (HCC): ICD-10-CM

## 2024-02-22 DIAGNOSIS — M89.9 SKULL LESION: ICD-10-CM

## 2024-02-22 DIAGNOSIS — F90.2 ATTENTION DEFICIT HYPERACTIVITY DISORDER, COMBINED TYPE: ICD-10-CM

## 2024-02-22 DIAGNOSIS — E55.9 VITAMIN D DEFICIENCY: ICD-10-CM

## 2024-02-22 PROCEDURE — 3078F DIAST BP <80 MM HG: CPT | Performed by: INTERNAL MEDICINE

## 2024-02-22 PROCEDURE — 3074F SYST BP LT 130 MM HG: CPT | Performed by: INTERNAL MEDICINE

## 2024-02-22 PROCEDURE — 99214 OFFICE O/P EST MOD 30 MIN: CPT | Performed by: INTERNAL MEDICINE

## 2024-02-22 ASSESSMENT — FIBROSIS 4 INDEX: FIB4 SCORE: 0.35

## 2024-02-23 NOTE — ASSESSMENT & PLAN NOTE
This is a new condition noted by patient's wife  Denies recent trauma or injury.  This was noted after the patient had the recent head cut.  No pain or discomfort.

## 2024-02-23 NOTE — ASSESSMENT & PLAN NOTE
Chronic condition.  Symptoms been noted since last several months.  Patient denies fever chills chest pain or shortness of breath.  Recent lab test ordered by PCP showed normal CBC and chemistry panel.  TSH also normal.    Patient requests lab test done to check vitamin B12 level.    On further questioning the patient was noted with loud snoring and apneic episode.  I recommend a referral to sleep specialist to consider doing a sleep study to rule out possible sleep apnea.  The patient refused.

## 2024-02-23 NOTE — PROGRESS NOTES
PRIMARY CARE CLINIC VISIT        Chief Complaint   Patient presents with    Follow-Up     Follow up on labs. Bump on back of head.       Lab test result hyperlipidemia  Vitamin D deficiency  Overweight  Attention deficit disorder  Bipolar disorder  Chronic fatigue        History of Present Illness     Skull lesion  This is a new condition noted by patient's wife  Denies recent trauma or injury.  This was noted after the patient had the recent head cut.  No pain or discomfort.    Bipolar 1 disorder, depressed (HCC)  Ongoing condition.  Patient presently not on therapy.  Patient's wife stated patient refused to see psychiatrist.  Refused to be on medication.    Chronic fatigue  Chronic condition.  Symptoms been noted since last several months.  Patient denies fever chills chest pain or shortness of breath.  Recent lab test ordered by PCP showed normal CBC and chemistry panel.  TSH also normal.    Patient requests lab test done to check vitamin B12 level.    On further questioning the patient was noted with loud snoring and apneic episode.  I recommend a referral to sleep specialist to consider doing a sleep study to rule out possible sleep apnea.  The patient refused.    Dyslipidemia  This is a chronic condition.  Recent lab test ordered by PCP.  Result discussed with the patient    Vitamin D deficiency   new condition.  Noted with lab test result.  Patient asymptomatic.    Current Outpatient Medications on File Prior to Visit   Medication Sig Dispense Refill    ibuprofen (MOTRIN) 800 MG Tab Take 800 mg by mouth every 6 hours as needed.  FOR PAIN       No current facility-administered medications on file prior to visit.        Allergies: Penicillins    Current Outpatient Medications Ordered in Epic   Medication Sig Dispense Refill    ibuprofen (MOTRIN) 800 MG Tab Take 800 mg by mouth every 6 hours as needed.  FOR PAIN       No current Epic-ordered facility-administered medications on file.       History reviewed. No  "pertinent past medical history.    Past Surgical History:   Procedure Laterality Date    APPENDECTOMY      TONSILLECTOMY         Family History   Problem Relation Age of Onset    GI Disease Mother         Crohn's    Bipolar disorder Father     Bipolar disorder Sister     Migraines Sister     Autism Brother     Bipolar disorder Brother     Cancer Maternal Grandfather     Prostate cancer Maternal Grandfather        Social History     Tobacco Use   Smoking Status Never   Smokeless Tobacco Never       Social History     Substance and Sexual Activity   Alcohol Use Not Currently       Review of systems.  As per HPI above. All other systems reviewed and negative.      Past Medical, Social, and Family history reviewed and updated in EPIC     Objective     /78 (BP Location: Right arm, Patient Position: Sitting, BP Cuff Size: Adult)   Pulse 72   Temp 36.4 °C (97.6 °F) (Temporal)   Ht 1.905 m (6' 3\")   Wt (!) 136 kg (300 lb)   SpO2 97%    Body mass index is 37.5 kg/m².    General: alert in no apparent distress.  Cardiovascular: regular rate and rhythm  Pulmonary: lungs : no wheezing   Gastrointestinal: BS present.   Skull there is no acute abnormal finding with palpation.  The \"bump\" noticed by patient's wife felt to be normal part of the skull no pain or discomfort noted.  No fluctuance.  No discharge or redness. No  Bleeding.        No results found for: \"HBA1C\"    Lab Results   Component Value Date/Time    WBC 6.7 02/03/2024 09:11 AM    HEMOGLOBIN 17.0 02/03/2024 09:11 AM    HEMATOCRIT 52.1 (H) 02/03/2024 09:11 AM    MCV 83.0 02/03/2024 09:11 AM    PLATELETCT 339 02/03/2024 09:11 AM         Lab Results   Component Value Date/Time    SODIUM 138 02/03/2024 09:11 AM    POTASSIUM 4.4 02/03/2024 09:11 AM    GLUCOSE 89 02/03/2024 09:11 AM    BUN 16 02/03/2024 09:11 AM    CREATININE 0.87 02/03/2024 09:11 AM       Lab Results   Component Value Date/Time    CHOLSTRLTOT 176 02/03/2024 09:11 AM    TRIGLYCERIDE 132 " 02/03/2024 09:11 AM    HDL 36 (A) 02/03/2024 09:11 AM     (H) 02/03/2024 09:11 AM       Lab Results   Component Value Date/Time    ALTSGPT 17 02/03/2024 09:11 AM             Assessment and Plan     1. Dyslipidemia  This is a chronic condition.  Recommend diet and exercise.  Recommend patient to follow-up with PCP in a few months to repeat lipid panel.    2. Vitamin D deficiency  This is a new condition.  Recommend patient to start taking vitamin D3 2000 unit daily.  Recommend patient to follow-up with PCP in a few months to repeat the lab test    3. Overweight  Condition.  Uncontrolled.  Recommend diet and postop modification.  Advised the patient to lose weight      4. Attention deficit hyperactivity disorder, combined type  5. Bipolar 1 disorder, depressed (HCC)  Chronic ongoing condition.  The patient refused to be referred to see psychiatry.  She refused to take medication.      6. Chronic fatigue  This is a chronic condition.  Recent lab test show normal CBC and chemistry panel.  TSH also normal.  Order vitamin B12 per patient request.  I suspect that his condition could be due to untreated sleep apnea.  The patient however refused to be referred to sleep specialist.  - VITAMIN B12; Future              Attestation: I spent:   33  min -  That includes time for chart review before the visit, the actual patient visit, and time spent on documentation in EMR after the visit.  Chart review/prep, review of other providers' records, imaging/lab review, face-to-face time for history/examination, pt's counseling/education, ordering, prescribing,  review of results/meds/ treatment plan with patient, and care coordination.           Healthcare Maintenance       Health Maintenance Due   Topic Date Due    Hepatitis C Screening  Never done    HPV Vaccines (3 - Male 3-dose series) 05/23/2017    Influenza Vaccine (1) 09/01/2023    COVID-19 Vaccine (3 - 2023-24 season) 09/01/2023               Please note that this  dictation was created using voice recognition software. I have made every reasonable attempt to correct obvious errors, but I expect that there are errors of grammar and possibly content that I did not discover before finalizing the note.    Santos Perkins MD  Internal Medicine  [facility]

## 2024-02-23 NOTE — ASSESSMENT & PLAN NOTE
Ongoing condition.  Patient presently not on therapy.  Patient's wife stated patient refused to see psychiatrist.  Refused to be on medication.

## 2024-03-19 NOTE — PROGRESS NOTES
[facility]  GENERAL NEUROLOGY  NEW PATIENT VISIT    Referral source: Mary Anne Alvarez MD    CC: Migraine without status migrainosus, not intractable, unspecified migraine type    HISTORY OF ILLNESS:  Lex Galvin is a 28 y.o. man with a history most notable for migraine. He was previously seen in [facility]. Today, Jaime provided the following history:    Migraine description:    The following is a summary of headache symptoms, presented in my standard format:    Family History:   Age at onset (years):   Location:   Radiation:   Frequency:   Duration:   Headache Days/Month:   Quality:   Intensity:   Aura:   Photophobia/Phonophobia/Nausea/Vomiting:   Provoked by Physical Activity?:   Triggers:   Associated Symptoms:   Autonomic Signs (such as ptosis, miosis, conjunctival injection, rhinorrhea, increased lacrimation):   Head Trauma:   Association with Menses:   ED Visits:   Hospitalizations:   Missed Work Days ():   Sleep (hours/night):   Caffeine Intake:   Hydration:   Nutrition:   Exercise:   Analgesic Overuse:     Current Medication Regimen:  -     Medications Tried: Response  Preventive:  - Depakote    Rescue:  - Rizatriptan  - Sumatriptan    Medications Not Tried:  -     MEDICAL AND SURGICAL HISTORY:  No past medical history on file.  Past Surgical History:   Procedure Laterality Date    APPENDECTOMY      TONSILLECTOMY       MEDICATIONS:  Current Outpatient Medications   Medication Sig    ibuprofen (MOTRIN) 800 MG Tab Take 800 mg by mouth every 6 hours as needed.  FOR PAIN     SOCIAL HISTORY:  Social History     Tobacco Use    Smoking status: Never    Smokeless tobacco: Never   Substance Use Topics    Alcohol use: Not Currently     Social History     Social History Narrative    Not on file     FAMILY HISTORY:  Family History   Problem Relation Age of Onset    GI Disease Mother         Crohn's    Bipolar disorder Father     Bipolar disorder Sister     Migraines Sister     Autism Brother     Bipolar  disorder Brother     Cancer Maternal Grandfather     Prostate cancer Maternal Grandfather        Vitals ***    REVIEW OF SYSTEMS:  A ROS was completed.  Pertinent positives and negatives were included in the HPI, above.  All other systems were reviewed and are negative.    PHYSICAL EXAM:  General/Medical:  - NAD  - hair, skin, nails, and joints were normal    Neuro:  MENTAL STATUS: awake and alert; no deficits of speech or language; oriented to person, place, and time; affect was appropriate to situation    CRANIAL NERVES:    II: acuity: J***/J***, fields: intact to confrontation, pupils: 3/3 to 2/2 without a relative afferent pupillary defect, discs: sharp, no red desaturation noted    III/IV/VI: versions: intact without nystagmus    V: facial sensation: symmetric to light touch    VII: facial expression: symmetric    VIII: hearing: intact to finger rub    IX/X: palate: elevates symmetrically    XI: shoulder shrug: symmetric    XII: tongue: midline    MOTOR:  - bulk: normal throughout  - tone: normal throughout  Upper Extremity Strength  (R/L)    5/5   Elbow flexion 5/5   Elbow extension 5/5   Shoulder abduction 5/5     Lower Extremity Strength  (R/L)   Hip flexion 5/5   Knee extension 5/5   Knee flexion 5/5   Ankle plantarflexion 5/5   Ankle dorsiflexion 5/5     - pronator drift: absent  - abnormal movements: none    SENSATION:  - light touch: ***  - vibration (R/L, seconds): ***/*** at the great toes  - temperature:***  - pinprick: ***  - proprioception: ***  - Romberg: absent    COORDINATION:  - finger to nose: normal, no ataxia on exam  - finger tapping: rapid and accurate, bilaterally  - foot tapping:***    REFLEXES:  Reflex Right Left   BR 2+ 2+   Biceps 2+ 2+   Triceps 2+ 2+   Patellae 2+ 2+   Achilles 2+ 2+   Toes down down     GAIT:  - narrow base and normal  - heel-walk:   - toe-walk:   - tandem gait: intact    REVIEW OF IMAGING STUDIES: I reviewed the following studies:  MRI Brain:  Date:  "11/18/22  W/o and w/ contrast?: with and without  Indication: \"follow up intracranial cyst\"  Comparison: 12/15/11  Impression:  Unchanged appearances of a left middle cranial fossa arachnoid cyst and right lateral ventricular cyst.   3.8 x 2.7 x 2.4 cm, not significantly changed in size when compared to the previous exam.   REVIEW OF LABORATORY STUDIES:  2/3/24 CBC WNL except RBC 6.28, Hematocrit 52.1,   2/3/24 CMP WNL  2/3/24 TSH WNL  ASSESSMENT& PLAN:      Signed: JOSE DE JESUS Trujillo    "

## 2024-03-22 ENCOUNTER — APPOINTMENT (OUTPATIENT)
Dept: NEUROLOGY | Facility: MEDICAL CENTER | Age: 29
End: 2024-03-22
Payer: COMMERCIAL
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
## 2024-05-17 ENCOUNTER — OFFICE VISIT (OUTPATIENT)
Dept: NEUROLOGY | Facility: MEDICAL CENTER | Age: 29
End: 2024-05-17
Payer: COMMERCIAL

## 2024-05-17 VITALS
TEMPERATURE: 97.7 F | OXYGEN SATURATION: 97 % | WEIGHT: 311.29 LBS | SYSTOLIC BLOOD PRESSURE: 118 MMHG | BODY MASS INDEX: 38.71 KG/M2 | HEART RATE: 67 BPM | DIASTOLIC BLOOD PRESSURE: 66 MMHG | HEIGHT: 75 IN

## 2024-05-17 DIAGNOSIS — G43.101 MIGRAINE WITH AURA AND WITH STATUS MIGRAINOSUS, NOT INTRACTABLE: ICD-10-CM

## 2024-05-17 PROCEDURE — 3078F DIAST BP <80 MM HG: CPT

## 2024-05-17 PROCEDURE — 99205 OFFICE O/P NEW HI 60 MIN: CPT

## 2024-05-17 PROCEDURE — 3074F SYST BP LT 130 MM HG: CPT

## 2024-05-17 ASSESSMENT — FIBROSIS 4 INDEX: FIB4 SCORE: 0.37

## 2024-05-17 NOTE — PATIENT INSTRUCTIONS
Try supplementing:  - magnesium: 400 mg   - riboflavin (vitamin B2): 400 mg once daily  - coenzyme Q10: 300 mg daily     [phone] radiology    Nerivio [email or web address]    Consider:  Accupressure, acupuncture, mediation, journal

## 2024-06-27 ENCOUNTER — OFFICE VISIT (OUTPATIENT)
Dept: URGENT CARE | Facility: PHYSICIAN GROUP | Age: 29
End: 2024-06-27
Payer: COMMERCIAL

## 2024-06-27 VITALS
OXYGEN SATURATION: 96 % | WEIGHT: 302.3 LBS | RESPIRATION RATE: 16 BRPM | HEIGHT: 75 IN | HEART RATE: 63 BPM | SYSTOLIC BLOOD PRESSURE: 132 MMHG | DIASTOLIC BLOOD PRESSURE: 76 MMHG | BODY MASS INDEX: 37.59 KG/M2 | TEMPERATURE: 98.3 F

## 2024-06-27 DIAGNOSIS — R42 DIZZINESS: ICD-10-CM

## 2024-06-27 DIAGNOSIS — R63.0 LACK OF APPETITE: ICD-10-CM

## 2024-06-27 DIAGNOSIS — G43.909 MIGRAINE WITHOUT STATUS MIGRAINOSUS, NOT INTRACTABLE, UNSPECIFIED MIGRAINE TYPE: ICD-10-CM

## 2024-06-27 RX ORDER — KETOROLAC TROMETHAMINE 30 MG/ML
15 INJECTION, SOLUTION INTRAMUSCULAR; INTRAVENOUS ONCE
Status: COMPLETED | OUTPATIENT
Start: 2024-06-27 | End: 2024-06-27

## 2024-06-27 RX ADMIN — KETOROLAC TROMETHAMINE 15 MG: 30 INJECTION, SOLUTION INTRAMUSCULAR; INTRAVENOUS at 17:37

## 2024-06-27 ASSESSMENT — ENCOUNTER SYMPTOMS
HEADACHES: 1
SINUS PAIN: 0
ABDOMINAL PAIN: 0
NAUSEA: 1
EYE PAIN: 0
EYE DISCHARGE: 0
EYE REDNESS: 0
DIARRHEA: 0
FEVER: 0
CHILLS: 0
DIAPHORESIS: 0
VOMITING: 0
SORE THROAT: 0
SHORTNESS OF BREATH: 0
CONSTIPATION: 0
COUGH: 0
WHEEZING: 0
DIZZINESS: 1

## 2024-06-27 ASSESSMENT — FIBROSIS 4 INDEX: FIB4 SCORE: 0.37

## 2024-06-27 NOTE — LETTER
[facility] U[facility] [address] [id number]     June 27, 2024    Patient: Lex Galvin   YOB: 1995   Date of Visit: 6/27/2024       To Whom It May Concern:    Lex Galvin was seen and treated in our department on 6/27/2024.  Please excuse from work on 6/25/2024 - 6/27/2024    Sincerely,     Souleymane Prescott P.A.-C.

## 2024-06-28 NOTE — PROGRESS NOTES
"  Subjective:     Lex Galvin  is a 29 y.o. male who presents for Nausea (On going head aches, nausea, dizziness, both ears have been bothering him, chills, sore throat and cough on Monday, all other sx started about a week ago,; doctors note from the 25th- today )       He presents today with headache that has been ongoing over the past week.  Notes experiencing nausea and dizziness over the past week as well.  Nausea has limited his appetite.  Notes recent close sick contacts before symptom onset.  Does have a history of migraines and describes his headache as being similar to previous migraines.  He was previously experiencing chills, sore throat and cough but these have resolved at this time.  No chest pain or shortness of breath, no abdominal pain, no diarrhea.  Has used over-the-counter medications for symptoms       Review of Systems   Constitutional:  Negative for chills, diaphoresis, fever and malaise/fatigue.   HENT:  Negative for congestion, ear discharge, sinus pain and sore throat.    Eyes:  Negative for pain, discharge and redness.   Respiratory:  Negative for cough, shortness of breath and wheezing.    Cardiovascular:  Negative for chest pain.   Gastrointestinal:  Positive for nausea. Negative for abdominal pain, constipation, diarrhea and vomiting.   Neurological:  Positive for dizziness and headaches.      Allergies   Allergen Reactions    Penicillins      Upset stomach     History reviewed. No pertinent past medical history.     Objective:   /76 (BP Location: Left arm, Patient Position: Sitting, BP Cuff Size: Large adult)   Pulse 63   Temp 36.8 °C (98.3 °F) (Temporal)   Resp 16   Ht 1.905 m (6' 3\")   Wt (!) 137 kg (302 lb 4.8 oz)   SpO2 96%   BMI 37.78 kg/m²   Physical Exam  Vitals and nursing note reviewed.   Constitutional:       General: He is not in acute distress.     Appearance: Normal appearance. He is not ill-appearing, toxic-appearing or diaphoretic.   HENT:      " Head: Normocephalic.      Right Ear: Tympanic membrane, ear canal and external ear normal. There is no impacted cerumen.      Left Ear: Tympanic membrane, ear canal and external ear normal. There is no impacted cerumen.      Nose: No congestion or rhinorrhea.      Mouth/Throat:      Mouth: Mucous membranes are moist.      Pharynx: No oropharyngeal exudate or posterior oropharyngeal erythema.   Eyes:      General:         Right eye: No discharge.         Left eye: No discharge.      Extraocular Movements: Extraocular movements intact.      Conjunctiva/sclera: Conjunctivae normal.      Pupils: Pupils are equal, round, and reactive to light.      Comments: No nystagmus on exam.  Normal test of skew   Cardiovascular:      Rate and Rhythm: Normal rate and regular rhythm.   Pulmonary:      Effort: Pulmonary effort is normal. No respiratory distress.      Breath sounds: Normal breath sounds. No stridor. No wheezing or rhonchi.   Musculoskeletal:      Cervical back: Neck supple.   Lymphadenopathy:      Cervical: No cervical adenopathy.   Neurological:      General: No focal deficit present.      Mental Status: He is alert and oriented to person, place, and time.      Cranial Nerves: No cranial nerve deficit.      Sensory: No sensory deficit.   Psychiatric:         Mood and Affect: Mood normal.         Behavior: Behavior normal.         Thought Content: Thought content normal.         Judgment: Judgment normal.             Diagnostic testing: None    Assessment/Plan:     Encounter Diagnoses   Name Primary?    Migraine without status migrainosus, not intractable, unspecified migraine type     Dizziness     Lack of appetite           Plan for care for today's complaint includes providing the patient 15 mg Toradol injection in office today for his ongoing headache and migraine symptoms.  Neurological and cranial nerve exam was normal today.  Discussed with the patient that his dizziness is likely related to his reduced appetite  and fluid intake over the past week.  Encouraged appropriate p.o rehydration techniques, brat diet discussed.  Vital signs were stable during today's office visit, patient was overall well-appearing. Continue to monitor symptoms and return to urgent care or follow-up with primary care provider if symptoms remain ongoing.  Follow-up in the emergency department if symptoms become severe, ER precautions discussed in office today..    See AVS Instructions below for written guidance provided to patient on after-visit management and care in addition to our verbal discussion during the visit.    Please note that this dictation was created using voice recognition software. I have attempted to correct all errors, but there may be sound-alike, spelling, grammar and possibly content errors that I did not discover before finalizing the note.    Souleymaneannmarie Prescott PA-C

## 2024-08-20 ENCOUNTER — APPOINTMENT (OUTPATIENT)
Dept: NEUROLOGY | Facility: MEDICAL CENTER | Age: 29
End: 2024-08-20
Payer: COMMERCIAL

## 2024-12-10 DIAGNOSIS — F31.9 BIPOLAR 1 DISORDER, DEPRESSED (HCC): ICD-10-CM

## 2024-12-10 DIAGNOSIS — F90.2 ATTENTION DEFICIT HYPERACTIVITY DISORDER, COMBINED TYPE: ICD-10-CM

## 2025-02-19 ENCOUNTER — PATIENT MESSAGE (OUTPATIENT)
Dept: MEDICAL GROUP | Facility: PHYSICIAN GROUP | Age: 30
End: 2025-02-19
Payer: COMMERCIAL

## 2025-05-16 ENCOUNTER — OFFICE VISIT (OUTPATIENT)
Dept: URGENT CARE | Facility: PHYSICIAN GROUP | Age: 30
End: 2025-05-16
Payer: COMMERCIAL

## 2025-05-16 VITALS
HEIGHT: 75 IN | RESPIRATION RATE: 16 BRPM | SYSTOLIC BLOOD PRESSURE: 138 MMHG | BODY MASS INDEX: 37.3 KG/M2 | HEART RATE: 69 BPM | OXYGEN SATURATION: 95 % | WEIGHT: 300 LBS | TEMPERATURE: 97.6 F | DIASTOLIC BLOOD PRESSURE: 78 MMHG

## 2025-05-16 DIAGNOSIS — R20.0 RIGHT ARM NUMBNESS: Primary | ICD-10-CM

## 2025-05-16 PROCEDURE — 99213 OFFICE O/P EST LOW 20 MIN: CPT | Performed by: FAMILY MEDICINE

## 2025-05-16 PROCEDURE — 3078F DIAST BP <80 MM HG: CPT | Performed by: FAMILY MEDICINE

## 2025-05-16 PROCEDURE — 3075F SYST BP GE 130 - 139MM HG: CPT | Performed by: FAMILY MEDICINE

## 2025-05-16 ASSESSMENT — ENCOUNTER SYMPTOMS
GASTROINTESTINAL NEGATIVE: 1
CONSTITUTIONAL NEGATIVE: 1
EYES NEGATIVE: 1
CARDIOVASCULAR NEGATIVE: 1
RESPIRATORY NEGATIVE: 1

## 2025-05-16 ASSESSMENT — FIBROSIS 4 INDEX: FIB4 SCORE: 0.38

## 2025-05-16 NOTE — PROGRESS NOTES
"Subjective:   Lex Galvin is a 30 y.o. male who presents for Bump (Right forearm lump for about a year, now going numb over the past week or so )      Garces forearm 1 cm cystic lesion on the surface, mobile smooth, has been there for 8 years.  Having numbness that comes and goes, no weakness or injury.        Review of Systems   Constitutional: Negative.    HENT: Negative.     Eyes: Negative.    Respiratory: Negative.     Cardiovascular: Negative.    Gastrointestinal: Negative.    Genitourinary: Negative.    Musculoskeletal:         Right arm numbness   Skin: Negative.        Medications, Allergies, and current problem list reviewed today in Epic.     Objective:     /78 (BP Location: Left arm, Patient Position: Sitting, BP Cuff Size: Adult)   Pulse 69   Temp 36.4 °C (97.6 °F) (Temporal)   Resp 16   Ht 1.905 m (6' 3\")   Wt (!) 136 kg (300 lb)   SpO2 95%     Physical Exam  Vitals and nursing note reviewed.   Constitutional:       Appearance: Normal appearance.   Cardiovascular:      Rate and Rhythm: Normal rate and regular rhythm.      Pulses: Normal pulses.      Heart sounds: Normal heart sounds.   Pulmonary:      Effort: Pulmonary effort is normal.      Breath sounds: Normal breath sounds.   Musculoskeletal:      Comments: Good, strength, no numbness currently   Neurological:      Mental Status: He is alert.         Assessment/Plan:     Diagnosis and associated orders:     1. Right arm numbness           Comments/MDM:     Probably pressure related         Differential diagnosis, natural history, supportive care, and indications for immediate follow-up discussed.    Advised the patient to follow-up with the primary care physician for recheck, reevaluation, and consideration of further management.    Please note that this dictation was created using voice recognition software. I have made a reasonable attempt to correct obvious errors, but I expect that there are errors of grammar and possibly " content that I did not discover before finalizing the note.    This note was electronically signed by Juan Stoner M.D.

## 2025-08-25 ENCOUNTER — OFFICE VISIT (OUTPATIENT)
Dept: URGENT CARE | Facility: PHYSICIAN GROUP | Age: 30
End: 2025-08-25
Payer: COMMERCIAL

## 2025-08-25 VITALS
HEIGHT: 75 IN | TEMPERATURE: 97.3 F | OXYGEN SATURATION: 96 % | RESPIRATION RATE: 18 BRPM | HEART RATE: 61 BPM | SYSTOLIC BLOOD PRESSURE: 122 MMHG | BODY MASS INDEX: 37.03 KG/M2 | DIASTOLIC BLOOD PRESSURE: 68 MMHG | WEIGHT: 297.84 LBS

## 2025-08-25 DIAGNOSIS — R50.9 FEVER, UNSPECIFIED FEVER CAUSE: ICD-10-CM

## 2025-08-25 DIAGNOSIS — U07.1 COVID: ICD-10-CM

## 2025-08-25 LAB
FLUAV RNA SPEC QL NAA+PROBE: NEGATIVE
FLUBV RNA SPEC QL NAA+PROBE: NEGATIVE
RSV RNA SPEC QL NAA+PROBE: NEGATIVE
SARS-COV-2 RNA RESP QL NAA+PROBE: POSITIVE

## 2025-08-25 PROCEDURE — 3078F DIAST BP <80 MM HG: CPT | Performed by: PHYSICIAN ASSISTANT

## 2025-08-25 PROCEDURE — 99214 OFFICE O/P EST MOD 30 MIN: CPT | Performed by: PHYSICIAN ASSISTANT

## 2025-08-25 PROCEDURE — 87637 SARSCOV2&INF A&B&RSV AMP PRB: CPT | Performed by: PHYSICIAN ASSISTANT

## 2025-08-25 PROCEDURE — 3074F SYST BP LT 130 MM HG: CPT | Performed by: PHYSICIAN ASSISTANT

## 2025-08-25 RX ORDER — DEXTROMETHORPHAN HYDROBROMIDE AND PROMETHAZINE HYDROCHLORIDE 15; 6.25 MG/5ML; MG/5ML
5 SYRUP ORAL 3 TIMES DAILY PRN
Qty: 120 ML | Refills: 0 | Status: SHIPPED | OUTPATIENT
Start: 2025-08-25

## 2025-08-25 RX ORDER — ONDANSETRON 4 MG/1
4 TABLET, ORALLY DISINTEGRATING ORAL EVERY 6 HOURS PRN
Qty: 15 TABLET | Refills: 0 | Status: SHIPPED | OUTPATIENT
Start: 2025-08-25

## 2025-08-25 ASSESSMENT — LIFESTYLE VARIABLES
HOW OFTEN DO YOU HAVE A DRINK CONTAINING ALCOHOL: PATIENT DECLINED
HOW OFTEN DO YOU HAVE SIX OR MORE DRINKS ON ONE OCCASION: PATIENT DECLINED
AUDIT-C TOTAL SCORE: -1
HOW MANY STANDARD DRINKS CONTAINING ALCOHOL DO YOU HAVE ON A TYPICAL DAY: PATIENT DECLINED
SKIP TO QUESTIONS 9-10: 0

## 2025-08-25 ASSESSMENT — ENCOUNTER SYMPTOMS
SORE THROAT: 0
COUGH: 1
GASTROINTESTINAL NEGATIVE: 1
FEVER: 1
WHEEZING: 0
CHILLS: 1
DIZZINESS: 1
CARDIOVASCULAR NEGATIVE: 1
RHINORRHEA: 1
HEADACHES: 1
MYALGIAS: 1
SHORTNESS OF BREATH: 1
HEMOPTYSIS: 0
SWEATS: 1

## 2025-08-25 ASSESSMENT — FIBROSIS 4 INDEX: FIB4 SCORE: 0.38

## 2025-08-26 ENCOUNTER — PATIENT MESSAGE (OUTPATIENT)
Dept: URGENT CARE | Facility: PHYSICIAN GROUP | Age: 30
End: 2025-08-26
Payer: COMMERCIAL

## 2025-08-30 ENCOUNTER — TELEPHONE (OUTPATIENT)
Dept: URGENT CARE | Facility: CLINIC | Age: 30
End: 2025-08-30
Payer: COMMERCIAL